# Patient Record
Sex: FEMALE | Race: BLACK OR AFRICAN AMERICAN | Employment: FULL TIME | URBAN - METROPOLITAN AREA
[De-identification: names, ages, dates, MRNs, and addresses within clinical notes are randomized per-mention and may not be internally consistent; named-entity substitution may affect disease eponyms.]

---

## 2017-05-23 ENCOUNTER — HOSPITAL ENCOUNTER (OUTPATIENT)
Dept: WOMENS IMAGING | Age: 62
Discharge: HOME | End: 2017-05-23
Attending: INTERNAL MEDICINE | Admitting: INTERNAL MEDICINE

## 2017-05-23 DIAGNOSIS — Z12.31 ENCOUNTER FOR SCREENING MAMMOGRAM FOR MALIGNANT NEOPLASM OF BREAST: ICD-10-CM

## 2020-08-26 ENCOUNTER — HOSPITAL ENCOUNTER (OUTPATIENT)
Age: 65
Setting detail: OBSERVATION
Discharge: HOME | End: 2020-08-27
Attending: EMERGENCY MEDICINE | Admitting: INTERNAL MEDICINE
Payer: COMMERCIAL

## 2020-08-26 ENCOUNTER — APPOINTMENT (OUTPATIENT)
Dept: GENERAL RADIOLOGY | Age: 65
End: 2020-08-26
Payer: COMMERCIAL

## 2020-08-26 LAB
ALBUMIN SERPL-MCNC: 4.5 GM/DL (ref 3.4–5)
ALP BLD-CCNC: 74 IU/L (ref 40–129)
ALT SERPL-CCNC: 22 U/L (ref 10–40)
ANION GAP SERPL CALCULATED.3IONS-SCNC: 13 MMOL/L (ref 4–16)
AST SERPL-CCNC: 29 IU/L (ref 15–37)
BASOPHILS ABSOLUTE: 0.1 K/CU MM
BASOPHILS RELATIVE PERCENT: 0.5 % (ref 0–1)
BILIRUB SERPL-MCNC: 0.5 MG/DL (ref 0–1)
BUN BLDV-MCNC: 21 MG/DL (ref 6–23)
CALCIUM SERPL-MCNC: 9.7 MG/DL (ref 8.3–10.6)
CHLORIDE BLD-SCNC: 99 MMOL/L (ref 99–110)
CO2: 25 MMOL/L (ref 21–32)
CREAT SERPL-MCNC: 1 MG/DL (ref 0.6–1.1)
D DIMER: <200 NG/ML(DDU)
DIFFERENTIAL TYPE: ABNORMAL
EOSINOPHILS ABSOLUTE: 0.3 K/CU MM
EOSINOPHILS RELATIVE PERCENT: 2.3 % (ref 0–3)
GFR AFRICAN AMERICAN: >60 ML/MIN/1.73M2
GFR NON-AFRICAN AMERICAN: 55 ML/MIN/1.73M2
GLUCOSE BLD-MCNC: 108 MG/DL (ref 70–99)
HCT VFR BLD CALC: 41.3 % (ref 37–47)
HEMOGLOBIN: 13.9 GM/DL (ref 12.5–16)
IMMATURE NEUTROPHIL %: 0.3 % (ref 0–0.43)
LIPASE: 30 IU/L (ref 13–60)
LYMPHOCYTES ABSOLUTE: 2.8 K/CU MM
LYMPHOCYTES RELATIVE PERCENT: 24.5 % (ref 24–44)
MCH RBC QN AUTO: 32.6 PG (ref 27–31)
MCHC RBC AUTO-ENTMCNC: 33.7 % (ref 32–36)
MCV RBC AUTO: 96.7 FL (ref 78–100)
MONOCYTES ABSOLUTE: 0.8 K/CU MM
MONOCYTES RELATIVE PERCENT: 7 % (ref 0–4)
NUCLEATED RBC %: 0 %
PDW BLD-RTO: 12.6 % (ref 11.7–14.9)
PLATELET # BLD: 392 K/CU MM (ref 140–440)
PMV BLD AUTO: 10.4 FL (ref 7.5–11.1)
POTASSIUM SERPL-SCNC: 3.3 MMOL/L (ref 3.5–5.1)
PRO-BNP: 34.93 PG/ML
RBC # BLD: 4.27 M/CU MM (ref 4.2–5.4)
SEGMENTED NEUTROPHILS ABSOLUTE COUNT: 7.6 K/CU MM
SEGMENTED NEUTROPHILS RELATIVE PERCENT: 65.4 % (ref 36–66)
SODIUM BLD-SCNC: 137 MMOL/L (ref 135–145)
TOTAL CK: 220 IU/L (ref 26–140)
TOTAL IMMATURE NEUTOROPHIL: 0.03 K/CU MM
TOTAL NUCLEATED RBC: 0 K/CU MM
TOTAL PROTEIN: 7.6 GM/DL (ref 6.4–8.2)
TROPONIN T: <0.01 NG/ML
WBC # BLD: 11.6 K/CU MM (ref 4–10.5)

## 2020-08-26 PROCEDURE — 36415 COLL VENOUS BLD VENIPUNCTURE: CPT

## 2020-08-26 PROCEDURE — 2580000003 HC RX 258: Performed by: EMERGENCY MEDICINE

## 2020-08-26 PROCEDURE — 2500000003 HC RX 250 WO HCPCS: Performed by: EMERGENCY MEDICINE

## 2020-08-26 PROCEDURE — 93005 ELECTROCARDIOGRAM TRACING: CPT | Performed by: EMERGENCY MEDICINE

## 2020-08-26 PROCEDURE — 71045 X-RAY EXAM CHEST 1 VIEW: CPT

## 2020-08-26 PROCEDURE — 83880 ASSAY OF NATRIURETIC PEPTIDE: CPT

## 2020-08-26 PROCEDURE — 84484 ASSAY OF TROPONIN QUANT: CPT

## 2020-08-26 PROCEDURE — 82550 ASSAY OF CK (CPK): CPT

## 2020-08-26 PROCEDURE — 85025 COMPLETE CBC W/AUTO DIFF WBC: CPT

## 2020-08-26 PROCEDURE — 96375 TX/PRO/DX INJ NEW DRUG ADDON: CPT

## 2020-08-26 PROCEDURE — 80053 COMPREHEN METABOLIC PANEL: CPT

## 2020-08-26 PROCEDURE — 99285 EMERGENCY DEPT VISIT HI MDM: CPT

## 2020-08-26 PROCEDURE — 6360000002 HC RX W HCPCS: Performed by: EMERGENCY MEDICINE

## 2020-08-26 PROCEDURE — G0378 HOSPITAL OBSERVATION PER HR: HCPCS

## 2020-08-26 PROCEDURE — 96374 THER/PROPH/DIAG INJ IV PUSH: CPT

## 2020-08-26 PROCEDURE — 83690 ASSAY OF LIPASE: CPT

## 2020-08-26 PROCEDURE — 6370000000 HC RX 637 (ALT 250 FOR IP): Performed by: EMERGENCY MEDICINE

## 2020-08-26 PROCEDURE — 85379 FIBRIN DEGRADATION QUANT: CPT

## 2020-08-26 RX ORDER — SODIUM CHLORIDE 9 MG/ML
INJECTION, SOLUTION INTRAVENOUS CONTINUOUS
Status: ACTIVE | OUTPATIENT
Start: 2020-08-27 | End: 2020-08-27

## 2020-08-26 RX ORDER — LISINOPRIL 20 MG/1
20 TABLET ORAL DAILY
Status: DISCONTINUED | OUTPATIENT
Start: 2020-08-27 | End: 2020-08-27 | Stop reason: HOSPADM

## 2020-08-26 RX ORDER — PANTOPRAZOLE SODIUM 40 MG/1
40 TABLET, DELAYED RELEASE ORAL
Status: DISCONTINUED | OUTPATIENT
Start: 2020-08-27 | End: 2020-08-27 | Stop reason: HOSPADM

## 2020-08-26 RX ORDER — POLYETHYLENE GLYCOL 3350 17 G/17G
17 POWDER, FOR SOLUTION ORAL DAILY PRN
Status: DISCONTINUED | OUTPATIENT
Start: 2020-08-26 | End: 2020-08-27 | Stop reason: HOSPADM

## 2020-08-26 RX ORDER — ONDANSETRON 2 MG/ML
4 INJECTION INTRAMUSCULAR; INTRAVENOUS EVERY 30 MIN PRN
Status: DISCONTINUED | OUTPATIENT
Start: 2020-08-26 | End: 2020-08-27 | Stop reason: SDUPTHER

## 2020-08-26 RX ORDER — ASPIRIN 81 MG/1
81 TABLET, CHEWABLE ORAL DAILY
Status: DISCONTINUED | OUTPATIENT
Start: 2020-08-27 | End: 2020-08-27 | Stop reason: HOSPADM

## 2020-08-26 RX ORDER — ONDANSETRON 2 MG/ML
4 INJECTION INTRAMUSCULAR; INTRAVENOUS EVERY 6 HOURS PRN
Status: DISCONTINUED | OUTPATIENT
Start: 2020-08-26 | End: 2020-08-27 | Stop reason: HOSPADM

## 2020-08-26 RX ORDER — 0.9 % SODIUM CHLORIDE 0.9 %
1000 INTRAVENOUS SOLUTION INTRAVENOUS ONCE
Status: COMPLETED | OUTPATIENT
Start: 2020-08-26 | End: 2020-08-27

## 2020-08-26 RX ORDER — SIMVASTATIN 40 MG
40 TABLET ORAL EVERY EVENING
Status: DISCONTINUED | OUTPATIENT
Start: 2020-08-27 | End: 2020-08-27 | Stop reason: SDUPTHER

## 2020-08-26 RX ORDER — ASPIRIN 81 MG/1
81 TABLET, CHEWABLE ORAL DAILY
Status: DISCONTINUED | OUTPATIENT
Start: 2020-08-27 | End: 2020-08-26 | Stop reason: SDUPTHER

## 2020-08-26 RX ORDER — SODIUM CHLORIDE 0.9 % (FLUSH) 0.9 %
10 SYRINGE (ML) INJECTION PRN
Status: DISCONTINUED | OUTPATIENT
Start: 2020-08-26 | End: 2020-08-27 | Stop reason: HOSPADM

## 2020-08-26 RX ORDER — CALCIUM CARBONATE 200(500)MG
500 TABLET,CHEWABLE ORAL 3 TIMES DAILY PRN
Status: DISCONTINUED | OUTPATIENT
Start: 2020-08-26 | End: 2020-08-27 | Stop reason: HOSPADM

## 2020-08-26 RX ORDER — PROMETHAZINE HYDROCHLORIDE 25 MG/1
12.5 TABLET ORAL EVERY 6 HOURS PRN
Status: DISCONTINUED | OUTPATIENT
Start: 2020-08-26 | End: 2020-08-27 | Stop reason: HOSPADM

## 2020-08-26 RX ORDER — CHLORTHALIDONE 25 MG/1
25 TABLET ORAL DAILY
COMMUNITY

## 2020-08-26 RX ORDER — ATORVASTATIN CALCIUM 40 MG/1
40 TABLET, FILM COATED ORAL NIGHTLY
Status: DISCONTINUED | OUTPATIENT
Start: 2020-08-27 | End: 2020-08-27 | Stop reason: HOSPADM

## 2020-08-26 RX ORDER — SODIUM CHLORIDE 0.9 % (FLUSH) 0.9 %
10 SYRINGE (ML) INJECTION EVERY 12 HOURS SCHEDULED
Status: DISCONTINUED | OUTPATIENT
Start: 2020-08-27 | End: 2020-08-27 | Stop reason: HOSPADM

## 2020-08-26 RX ORDER — ACETAMINOPHEN 325 MG/1
650 TABLET ORAL EVERY 6 HOURS PRN
Status: DISCONTINUED | OUTPATIENT
Start: 2020-08-26 | End: 2020-08-27 | Stop reason: HOSPADM

## 2020-08-26 RX ORDER — ASPIRIN 81 MG/1
324 TABLET, CHEWABLE ORAL ONCE
Status: COMPLETED | OUTPATIENT
Start: 2020-08-26 | End: 2020-08-26

## 2020-08-26 RX ORDER — NITROGLYCERIN 0.4 MG/1
0.4 TABLET SUBLINGUAL EVERY 5 MIN PRN
Status: DISCONTINUED | OUTPATIENT
Start: 2020-08-26 | End: 2020-08-27 | Stop reason: HOSPADM

## 2020-08-26 RX ORDER — POTASSIUM CHLORIDE 7.45 MG/ML
10 INJECTION INTRAVENOUS PRN
Status: DISCONTINUED | OUTPATIENT
Start: 2020-08-26 | End: 2020-08-27 | Stop reason: HOSPADM

## 2020-08-26 RX ORDER — ACETAMINOPHEN 650 MG/1
650 SUPPOSITORY RECTAL EVERY 6 HOURS PRN
Status: DISCONTINUED | OUTPATIENT
Start: 2020-08-26 | End: 2020-08-27 | Stop reason: HOSPADM

## 2020-08-26 RX ORDER — MORPHINE SULFATE 4 MG/ML
4 INJECTION, SOLUTION INTRAMUSCULAR; INTRAVENOUS EVERY 30 MIN PRN
Status: DISCONTINUED | OUTPATIENT
Start: 2020-08-26 | End: 2020-08-27 | Stop reason: ALTCHOICE

## 2020-08-26 RX ADMIN — ONDANSETRON 4 MG: 2 INJECTION INTRAMUSCULAR; INTRAVENOUS at 23:10

## 2020-08-26 RX ADMIN — ASPIRIN 81 MG CHEWABLE TABLET 324 MG: 81 TABLET CHEWABLE at 22:12

## 2020-08-26 RX ADMIN — MORPHINE SULFATE 4 MG: 4 INJECTION, SOLUTION INTRAMUSCULAR; INTRAVENOUS at 23:10

## 2020-08-26 RX ADMIN — FAMOTIDINE 20 MG: 10 INJECTION INTRAVENOUS at 23:10

## 2020-08-26 RX ADMIN — SODIUM CHLORIDE 1000 ML: 9 INJECTION, SOLUTION INTRAVENOUS at 23:26

## 2020-08-26 ASSESSMENT — ENCOUNTER SYMPTOMS
SHORTNESS OF BREATH: 1
EYES NEGATIVE: 1
CHEST TIGHTNESS: 1
ALLERGIC/IMMUNOLOGIC NEGATIVE: 1
NAUSEA: 1

## 2020-08-26 ASSESSMENT — PAIN DESCRIPTION - PAIN TYPE: TYPE: ACUTE PAIN

## 2020-08-26 ASSESSMENT — PAIN SCALES - GENERAL
PAINLEVEL_OUTOF10: 7
PAINLEVEL_OUTOF10: 7

## 2020-08-26 ASSESSMENT — PAIN DESCRIPTION - ORIENTATION: ORIENTATION: MID

## 2020-08-26 ASSESSMENT — PAIN DESCRIPTION - LOCATION: LOCATION: CHEST

## 2020-08-26 ASSESSMENT — PAIN DESCRIPTION - FREQUENCY: FREQUENCY: CONTINUOUS

## 2020-08-26 ASSESSMENT — PAIN DESCRIPTION - DESCRIPTORS: DESCRIPTORS: PRESSURE

## 2020-08-26 NOTE — ED PROVIDER NOTES
[facility]      TRIAGE CHIEF COMPLAINT:   Chest Pain      [place]:  Kike Schmidt is a 77 y.o. female that presents complaint of chest pain shortness of breath diaphoresis. Symptoms started around 330 today after getting off work. 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 Denies any fevers nausea vomiting cough coronavirus exposure abdominal pain she had some heartburn. History of hypertension, high cholesterol denies any cardiac problems or other lung problems. Denies any smoking drinking drugs. No swelling or rash no history of DVT PE or AAA. Tommas Baptise REVIEW OF SYSTEMS:  At least 10 systems reviewed and otherwise acutely negative except as in the [address] Review of Systems   Constitutional: Positive for diaphoresis. HENT: Negative. Eyes: Negative. Respiratory: Positive for chest tightness and shortness of breath. Cardiovascular: Positive for chest pain. Gastrointestinal: Positive for nausea. Endocrine: Negative. Genitourinary: Negative. Musculoskeletal: Negative. Skin: Negative. Allergic/Immunologic: Negative. Neurological: Negative. Hematological: Negative. Psychiatric/Behavioral: Negative. All other systems reviewed and are negative.       Past Medical History:   Diagnosis Date    Chronic pain     sciatica    GERD (gastroesophageal reflux disease)     Hyperlipidemia 7/2010    Hypertension 2007     Past Surgical History:   Procedure Laterality Date    CARDIAC CATHETERIZATION      CARDIOVASCULAR STRESS TEST  2/2013    normal    TUBAL LIGATION       Family History   Problem Relation Age of Onset    Hypertension Mother         aneurism    Diabetes Father         type 2    Stroke Father     Hypertension Sister     High Cholesterol Sister      Social History     Socioeconomic History    Marital status:      Spouse name: Not on file    Number of children: Not on file    Years of education: Not on file    Highest education level: lisinopril (PRINIVIL;ZESTRIL) 20 MG tablet Take 1 tablet by mouth daily. 30 tablet 4    famotidine (PEPCID) 40 MG tablet Take 1 tablet by mouth 2 times daily. 60 tablet 2    ibuprofen (ADVIL;MOTRIN) 200 MG tablet Take 200 mg by mouth every 6 hours as needed.  aspirin 81 MG tablet Take 81 mg by mouth daily. No Known Allergies  Current Facility-Administered Medications   Medication Dose Route Frequency Provider Last Rate Last Dose    morphine sulfate (PF) injection 4 mg  4 mg Intravenous Q30 Min PRN Ruffus Bolk, DO        ondansetron (ZOFRAN) injection 4 mg  4 mg Intravenous Q30 Min PRN Ruffus Bolk, DO        famotidine (PEPCID) injection 20 mg  20 mg Intravenous Once Ruffus Bolk, DO        0.9 % sodium chloride bolus  1,000 mL Intravenous Once Ruffus Bolk, DO         Current Outpatient Medications   Medication Sig Dispense Refill    simvastatin (ZOCOR) 40 MG tablet Take 1 tablet by mouth every evening. 30 tablet 3    atenolol-chlorthalidone (TENORETIC 100) 100-25 MG per tablet Take 1 tablet by mouth daily. 30 tablet 4    lisinopril (PRINIVIL;ZESTRIL) 20 MG tablet Take 1 tablet by mouth daily. 30 tablet 4    famotidine (PEPCID) 40 MG tablet Take 1 tablet by mouth 2 times daily. 60 tablet 2    ibuprofen (ADVIL;MOTRIN) 200 MG tablet Take 200 mg by mouth every 6 hours as needed.  aspirin 81 MG tablet Take 81 mg by mouth daily. Nursing Notes Reviewed    VITAL SIGNS:  ED Triage Vitals [08/26/20 1916]   Enc Vitals Group      /86      Pulse 99      Resp 20      Temp 98.6 °F (37 °C)      Temp Source Oral      SpO2 97 %      Weight 140 lb (63.5 kg)      Height 5' 3\" (1.6 m)      Head Circumference       Peak Flow       Pain Score       Pain Loc       Pain Edu? Excl. in [address]? PHYSICAL EXAM:  Physical Exam  Vitals signs and nursing note reviewed. Constitutional:       General: She is not in acute distress. Appearance: Normal appearance.  She is well-developed and well-groomed. She is not ill-appearing, toxic-appearing or diaphoretic. HENT:      Head: Normocephalic and atraumatic. Right Ear: External ear normal.      Left Ear: External ear normal.      Nose: No congestion or rhinorrhea. Eyes:      General: No scleral icterus. Right eye: No discharge. Left eye: No discharge. Extraocular Movements: Extraocular movements intact. Conjunctiva/sclera: Conjunctivae normal.   Neck:      Musculoskeletal: Full passive range of motion without pain and normal range of motion. Normal range of motion. No edema, erythema, neck rigidity, crepitus, injury or pain with movement. Vascular: No JVD. Trachea: Phonation normal.   Cardiovascular:      Rate and Rhythm: Normal rate and regular rhythm. Pulses: Normal pulses. Heart sounds: Normal heart sounds. No murmur. No friction rub. No gallop. Pulmonary:      Effort: Pulmonary effort is normal. No respiratory distress. Breath sounds: Normal breath sounds. No stridor. No wheezing, rhonchi or rales. Chest:      Chest wall: Tenderness present. Abdominal:      General: Bowel sounds are normal. There is no distension. Palpations: Abdomen is soft. There is no mass or pulsatile mass. Tenderness: There is abdominal tenderness in the epigastric area. There is no guarding or rebound. Negative signs include Estrada's sign, Rovsing's sign and McBurney's sign. Hernia: No hernia is present. Musculoskeletal: Normal range of motion. General: Tenderness present. No swelling, deformity or signs of injury. Right lower leg: No edema. Left lower leg: No edema. Skin:     General: Skin is warm. Coloration: Skin is not jaundiced or pale. Findings: No bruising, erythema, lesion or rash. Neurological:      General: No focal deficit present. Mental Status: She is alert and oriented to person, place, and time.       GCS: GCS eye subscore is 4. GCS verbal subscore is 5. GCS motor subscore is 6. Cranial Nerves: Cranial nerves are intact. No cranial nerve deficit, dysarthria or facial asymmetry. Sensory: Sensation is intact. No sensory deficit. Motor: Motor function is intact. No weakness, tremor, atrophy, abnormal muscle tone or seizure activity. Coordination: Coordination is intact. Coordination normal.   Psychiatric:         Mood and Affect: Mood normal.         Behavior: Behavior normal. Behavior is cooperative. Thought Content:  Thought content normal.         Judgment: Judgment normal.           I have reviewed andinterpreted all of the currently available lab results from this visit (if applicable):    Results for orders placed or performed during the hospital encounter of 08/26/20   CBC with Auto Diff   Result Value Ref Range    WBC 11.6 (H) 4.0 - 10.5 K/CU MM    RBC 4.27 4.2 - 5.4 M/CU MM    Hemoglobin 13.9 12.5 - 16.0 GM/DL    Hematocrit 41.3 37 - 47 %    MCV 96.7 78 - 100 FL    MCH 32.6 (H) 27 - 31 PG    MCHC 33.7 32.0 - 36.0 %    RDW 12.6 11.7 - 14.9 %    Platelets 715 695 - 916 K/CU MM    MPV 10.4 7.5 - 11.1 FL    Differential Type AUTOMATED DIFFERENTIAL     Segs Relative 65.4 36 - 66 %    Lymphocytes % 24.5 24 - 44 %    Monocytes % 7.0 (H) 0 - 4 %    Eosinophils % 2.3 0 - 3 %    Basophils % 0.5 0 - 1 %    Segs Absolute 7.6 K/CU MM    Lymphocytes Absolute 2.8 K/CU MM    Monocytes Absolute 0.8 K/CU MM    Eosinophils Absolute 0.3 K/CU MM    Basophils Absolute 0.1 K/CU MM    Nucleated RBC % 0.0 %    Total Nucleated RBC 0.0 K/CU MM    Total Immature Neutrophil 0.03 K/CU MM    Immature Neutrophil % 0.3 0 - 0.43 %   CMP   Result Value Ref Range    Sodium 137 135 - 145 MMOL/L    Potassium 3.3 (L) 3.5 - 5.1 MMOL/L    Chloride 99 99 - 110 mMol/L    CO2 25 21 - 32 MMOL/L    BUN 21 6 - 23 MG/DL    CREATININE 1.0 0.6 - 1.1 MG/DL    Glucose 108 (H) 70 - 99 MG/DL    Calcium 9.7 8.3 - 10.6 MG/DL    Alb 4.5 3.4 - 5.0 GM/DL    Total this afternoon after she was off work. 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 Denies any fevers nausea vomiting cough coronavirus exposure trauma weakness numbness tingling she says she got sweaty had some heartburn she is a history of hypertension, high cholesterol denies any cardiac or lung problems or smoking drinking drugs. No paresthesias no swelling or rash. Patient appears otherwise well no distress vital signs are stable patient given aspirin pain nausea medicine so far work-up is negative occluding d-dimer chest x-ray labs troponin EKG. Patient does have chest wall pain that does reproduce her symptoms she could have musculoskeletal pain but given her age and risk factors and symptoms will admit for ACS rule out otherwise patient stable. Patient okay with plan. Consulted hospital medicine. CLINICAL IMPRESSION:  Final diagnoses:   Chest pain, unspecified type   Chest wall pain   Dyspnea, unspecified type       (Please note that portions of this note may have been completed with a voice recognition program. Efforts were made to edit the dictations but occasionally words aremis-transcribed.)    DISPOSITION REFERRAL (if applicable):  No follow-up provider specified.     DISPOSITION MEDICATIONS (if applicable):  New Prescriptions    No medications on file          Crittenden Roel, [address], DO  08/26/20 7739

## 2020-08-26 NOTE — ED PROVIDER NOTES
As physician-in-triage, I performed a medical screening history and physical exam on this patient. HISTORY OF PRESENT ILLNESS  Ari Ahumada is a 77 y.o. female presents to the emergency department with central chest pressure that started at rest prior to arrival.  Describes it as constant, aching, 2 out of 10. Does not radiate up to her neck, through to her back, down her arms. Denies any diaphoresis, cough, fevers, chills, shortness of breath, dizziness, near syncope. No sick contacts at home. Denies any cough, fevers or chills. Denies any known CAD. Denies any abdominal pain. Faina Dakota PHYSICAL EXAM  /86   Pulse 99   Temp 98.6 °F (37 °C) (Oral)   Resp 20   Ht 5' 3\" (1.6 m)   Wt 140 lb (63.5 kg)   SpO2 97%   BMI 24.80 kg/m²     On exam, the patient appears in no acute distress. Speech is clear. Breathing is unlabored. Moves all extremities    Comment: Please note this report has been produced using speech recognition software and may contain errors related to that system including errors in grammar, punctuation, and spelling, as well as words and phrases that may be inappropriate. If there are any questions or concerns please feel free to contact the dictating provider for clarification.        Alma Espinal MD  08/26/20 0690

## 2020-08-26 NOTE — LETTER
[address] [address] [place] [address]  Phone: [phone]             August 27, 2020    Patient: Quynh Munoz   YOB: 1955   Date of Visit: 8/26/2020       To Whom It May Concern:    Quynh Munoz was seen and treated in our facility  beginning 8/26/2020 until 08/27/220. She may return to work on 08/29/2020.       Sincerely,       Dr. Rao [facility], [phone]        Signature:__________________________________

## 2020-08-27 ENCOUNTER — APPOINTMENT (OUTPATIENT)
Dept: NUCLEAR MEDICINE | Age: 65
End: 2020-08-27
Payer: COMMERCIAL

## 2020-08-27 VITALS
HEART RATE: 75 BPM | HEIGHT: 63 IN | SYSTOLIC BLOOD PRESSURE: 128 MMHG | OXYGEN SATURATION: 96 % | TEMPERATURE: 98.3 F | WEIGHT: 141.98 LBS | DIASTOLIC BLOOD PRESSURE: 79 MMHG | BODY MASS INDEX: 25.16 KG/M2 | RESPIRATION RATE: 19 BRPM

## 2020-08-27 LAB
ANION GAP SERPL CALCULATED.3IONS-SCNC: 8 MMOL/L (ref 4–16)
BACTERIA: NEGATIVE /HPF
BILIRUBIN URINE: NEGATIVE MG/DL
BLOOD, URINE: NEGATIVE
BUN BLDV-MCNC: 15 MG/DL (ref 6–23)
CALCIUM SERPL-MCNC: 8.7 MG/DL (ref 8.3–10.6)
CHLORIDE BLD-SCNC: 106 MMOL/L (ref 99–110)
CLARITY: CLEAR
CO2: 27 MMOL/L (ref 21–32)
COLOR: YELLOW
CREAT SERPL-MCNC: 0.9 MG/DL (ref 0.6–1.1)
EKG ATRIAL RATE: 67 BPM
EKG ATRIAL RATE: 93 BPM
EKG DIAGNOSIS: NORMAL
EKG DIAGNOSIS: NORMAL
EKG P AXIS: 49 DEGREES
EKG P AXIS: 53 DEGREES
EKG P-R INTERVAL: 190 MS
EKG P-R INTERVAL: 204 MS
EKG Q-T INTERVAL: 360 MS
EKG Q-T INTERVAL: 442 MS
EKG QRS DURATION: 84 MS
EKG QRS DURATION: 86 MS
EKG QTC CALCULATION (BAZETT): 447 MS
EKG QTC CALCULATION (BAZETT): 467 MS
EKG R AXIS: -49 DEGREES
EKG R AXIS: -60 DEGREES
EKG T AXIS: 30 DEGREES
EKG T AXIS: 43 DEGREES
EKG VENTRICULAR RATE: 67 BPM
EKG VENTRICULAR RATE: 93 BPM
GFR AFRICAN AMERICAN: >60 ML/MIN/1.73M2
GFR NON-AFRICAN AMERICAN: >60 ML/MIN/1.73M2
GLUCOSE BLD-MCNC: 83 MG/DL (ref 70–99)
GLUCOSE, URINE: NEGATIVE MG/DL
HCT VFR BLD CALC: 36.5 % (ref 37–47)
HEMOGLOBIN: 12.1 GM/DL (ref 12.5–16)
KETONES, URINE: NEGATIVE MG/DL
LEUKOCYTE ESTERASE, URINE: ABNORMAL
LV EF: 55 %
LV EF: 76 %
LVEF MODALITY: NORMAL
LVEF MODALITY: NORMAL
MCH RBC QN AUTO: 32.7 PG (ref 27–31)
MCHC RBC AUTO-ENTMCNC: 33.2 % (ref 32–36)
MCV RBC AUTO: 98.6 FL (ref 78–100)
NITRITE URINE, QUANTITATIVE: NEGATIVE
PDW BLD-RTO: 12.7 % (ref 11.7–14.9)
PH, URINE: 7 (ref 5–8)
PLATELET # BLD: 337 K/CU MM (ref 140–440)
PMV BLD AUTO: 10.4 FL (ref 7.5–11.1)
POTASSIUM SERPL-SCNC: 3.8 MMOL/L (ref 3.5–5.1)
PROTEIN UA: NEGATIVE MG/DL
RBC # BLD: 3.7 M/CU MM (ref 4.2–5.4)
RBC URINE: 1 /HPF (ref 0–6)
RENAL EPITHELIAL, UA: 1 /HPF
SODIUM BLD-SCNC: 141 MMOL/L (ref 135–145)
SPECIFIC GRAVITY UA: 1 (ref 1–1.03)
SQUAMOUS EPITHELIAL: 1 /HPF
TOTAL CK: 144 IU/L (ref 26–140)
TROPONIN T: <0.01 NG/ML
TROPONIN T: <0.01 NG/ML
UROBILINOGEN, URINE: NORMAL MG/DL (ref 0.2–1)
WBC # BLD: 8.1 K/CU MM (ref 4–10.5)
WBC UA: 11 /HPF (ref 0–5)

## 2020-08-27 PROCEDURE — G0378 HOSPITAL OBSERVATION PER HR: HCPCS

## 2020-08-27 PROCEDURE — 6360000002 HC RX W HCPCS: Performed by: INTERNAL MEDICINE

## 2020-08-27 PROCEDURE — 93010 ELECTROCARDIOGRAM REPORT: CPT | Performed by: INTERNAL MEDICINE

## 2020-08-27 PROCEDURE — 78452 HT MUSCLE IMAGE SPECT MULT: CPT

## 2020-08-27 PROCEDURE — 93005 ELECTROCARDIOGRAM TRACING: CPT | Performed by: NURSE PRACTITIONER

## 2020-08-27 PROCEDURE — 94761 N-INVAS EAR/PLS OXIMETRY MLT: CPT

## 2020-08-27 PROCEDURE — 84484 ASSAY OF TROPONIN QUANT: CPT

## 2020-08-27 PROCEDURE — 2580000003 HC RX 258: Performed by: NURSE PRACTITIONER

## 2020-08-27 PROCEDURE — 81001 URINALYSIS AUTO W/SCOPE: CPT

## 2020-08-27 PROCEDURE — 85027 COMPLETE CBC AUTOMATED: CPT

## 2020-08-27 PROCEDURE — 93017 CV STRESS TEST TRACING ONLY: CPT

## 2020-08-27 PROCEDURE — A9500 TC99M SESTAMIBI: HCPCS | Performed by: INTERNAL MEDICINE

## 2020-08-27 PROCEDURE — 87086 URINE CULTURE/COLONY COUNT: CPT

## 2020-08-27 PROCEDURE — 3430000000 HC RX DIAGNOSTIC RADIOPHARMACEUTICAL: Performed by: INTERNAL MEDICINE

## 2020-08-27 PROCEDURE — 93306 TTE W/DOPPLER COMPLETE: CPT

## 2020-08-27 PROCEDURE — 80048 BASIC METABOLIC PNL TOTAL CA: CPT

## 2020-08-27 PROCEDURE — 36415 COLL VENOUS BLD VENIPUNCTURE: CPT

## 2020-08-27 PROCEDURE — 82550 ASSAY OF CK (CPK): CPT

## 2020-08-27 PROCEDURE — 99203 OFFICE O/P NEW LOW 30 MIN: CPT | Performed by: INTERNAL MEDICINE

## 2020-08-27 PROCEDURE — 6370000000 HC RX 637 (ALT 250 FOR IP): Performed by: NURSE PRACTITIONER

## 2020-08-27 RX ORDER — ATENOLOL AND CHLORTHALIDONE TABLET 100; 25 MG/1; MG/1
1 TABLET ORAL DAILY
Qty: 30 TABLET | Refills: 4 | Status: SHIPPED | OUTPATIENT
Start: 2020-08-27

## 2020-08-27 RX ORDER — SIMVASTATIN 40 MG
40 TABLET ORAL EVERY EVENING
Qty: 30 TABLET | Refills: 3 | Status: SHIPPED | OUTPATIENT
Start: 2020-08-27

## 2020-08-27 RX ADMIN — REGADENOSON 0.4 MG: 0.08 INJECTION, SOLUTION INTRAVENOUS at 12:39

## 2020-08-27 RX ADMIN — Medication 30 MILLICURIE: at 12:45

## 2020-08-27 RX ADMIN — SODIUM CHLORIDE: 9 INJECTION, SOLUTION INTRAVENOUS at 01:00

## 2020-08-27 RX ADMIN — LISINOPRIL 20 MG: 20 TABLET ORAL at 15:30

## 2020-08-27 RX ADMIN — Medication 10 MILLICURIE: at 11:10

## 2020-08-27 RX ADMIN — METOPROLOL TARTRATE 25 MG: 25 TABLET, FILM COATED ORAL at 15:30

## 2020-08-27 RX ADMIN — SODIUM CHLORIDE, PRESERVATIVE FREE 10 ML: 5 INJECTION INTRAVENOUS at 15:30

## 2020-08-27 ASSESSMENT — PAIN SCALES - GENERAL
PAINLEVEL_OUTOF10: 0
PAINLEVEL_OUTOF10: 0

## 2020-08-27 NOTE — DISCHARGE SUMMARY
Discharge Summary    Name:  Ari Ahumada /Age/Sex: 1955  (77 y.o. female)   MRN & CSN:  [id number] & [id number] Admission Date/Time: 2020  8:54 PM   Attending:  Josse Lynn MD Discharging Physician: Hero Grover MD     Hospital Course:   Ari Ahumada is a 77 y.o.  female  who presents with chest pain.     #Acute Chest pain. R/O ACS. -Serial troponin have been negative  -EKG no acute ST-T changes  -Echo: EF 55%, no mention of diastolic dysfunction. -ASA, BB. Home lisinopril resumed.  -Stress test negative  -Plan is for DC on on her current medications and follow-up with cardiology as outpatient    Other medical conditions  Hypertension-lisinopril  Hyperlipidemia-Zocor  GERD-Protonix            The patient expressed appropriate understanding of and agreement with the discharge recommendations, medications, and plan. Consults this admission:  IP CONSULT TO HOSPITALIST  IP CONSULT TO CARDIOLOGY    Discharge Instruction:   Follow up appointments: Cardiology  Primary care physician:  within 2 weeks    Diet:  cardiac diet   Activity: activity as tolerated  Disposition: Discharged to:   [x]Home, [][facility], []SNF, []Acute Rehab, []Hospice   Condition on discharge: Stable    Discharge Medications:      Yovannyalexa Chao   Home Medication Instructions HEP:[id number]    Printed on:20 1014   Medication Information                      aspirin 81 MG tablet  Take 81 mg by mouth daily. chlorthalidone (HYGROTON) 25 MG tablet  Take 25 mg by mouth daily             famotidine (PEPCID) 40 MG tablet  Take 1 tablet by mouth 2 times daily. ibuprofen (ADVIL;MOTRIN) 200 MG tablet  Take 200 mg by mouth every 6 hours as needed. lisinopril (PRINIVIL;ZESTRIL) 20 MG tablet  Take 1 tablet by mouth daily.                  Objective Findings at Discharge:   /85   Pulse 75   Temp 97.9 °F (36.6 °C) (Oral)   Resp 19   Ht 5' 3\" (1.6 m)   Wt 141 lb 15.6 oz (64.4 kg)   SpO2 96%   BMI 25.15 kg/m²            PHYSICAL EXAM   GEN Awake female, sitting upright in bed in no apparent distress. Appears given age. EYES Pupils are equally round. No scleral erythema, discharge, or conjunctivitis. HENT Mucous membranes are moist. Oral pharynx without exudates, no evidence of thrush. NECK Supple, no apparent thyromegaly or masses. RESP Clear to auscultation, no wheezes, rales or rhonchi. Symmetric chest movement while on room air. CARDIO/VASC S1/S2 auscultated. Regular rate without appreciable murmurs, rubs, or gallops. No JVD or carotid bruits. Peripheral pulses equal bilaterally and palpable. No peripheral edema. GI Abdomen is soft without significant tenderness, masses, or guarding. Bowel sounds are normoactive. Rectal exam deferred.  No costovertebral angle tenderness. Normal appearing external genitalia. Willett catheter is not present. HEME/LYMPH No palpable cervical lymphadenopathy and no hepatosplenomegaly. No petechiae or ecchymoses. MSK No gross joint deformities. SKIN Normal coloration, warm, dry. NEURO Cranial nerves appear grossly intact, normal speech, no lateralizing weakness. PSYCH Awake, alert, oriented x 4. Affect appropriate.     BMP/CBC  Recent Labs     08/26/20  1914 08/27/20  0759     --    K 3.3*  --    CL 99  --    CO2 25  --    BUN 21  --    CREATININE 1.0  --    WBC 11.6* 8.1   HCT 41.3 36.5*    337       IMAGING:      Discharge Time of 35 minutes    Electronically signed by Johanna Nick MD on 8/27/2020 at 10:14 AM

## 2020-08-27 NOTE — PLAN OF CARE
RN  Outcome: Ongoing  8/27/2020 0025 by Elnea Duenas RN  Outcome: Ongoing     Problem: Serum Glucose Level - Abnormal:  Goal: Ability to maintain appropriate glucose levels will improve  Description: Ability to maintain appropriate glucose levels will improve  8/27/2020 0938 by Kin Menchaca RN  Outcome: Ongoing  8/27/2020 0025 by Elena Duenas RN  Outcome: Ongoing     Problem: Pain:  Goal: Pain level will decrease  Description: Pain level will decrease  8/27/2020 0938 by Kin Menchaca RN  Outcome: Ongoing  8/27/2020 0025 by Elena Duenas RN  Outcome: Ongoing  Goal: Control of acute pain  Description: Control of acute pain  8/27/2020 0938 by Kin Menchaca RN  Outcome: Ongoing  8/27/2020 0025 by Elena Duenas RN  Outcome: Ongoing  Goal: Control of chronic pain  Description: Control of chronic pain  8/27/2020 0938 by Kin Menchaca RN  Outcome: Ongoing  8/27/2020 0025 by Elena Duenas RN  Outcome: Ongoing     Problem: Tissue Perfusion - Cardiopulmonary, Altered:  Goal: Absence of angina  Description: Absence of angina  8/27/2020 0938 by Kin Menchaca RN  Outcome: Ongoing  8/27/2020 0025 by Elena Duenas RN  Outcome: Ongoing  Goal: Circulatory function within specified parameters  Description: Circulatory function within specified parameters  8/27/2020 0938 by Kin Menchaca RN  Outcome: Ongoing  8/27/2020 0025 by Elena Duenas RN  Outcome: Ongoing  Goal: Hemodynamic stability will improve  Description: Hemodynamic stability will improve  8/27/2020 0938 by Kin Menchaca RN  Outcome: Ongoing  8/27/2020 0025 by Elena Duenas RN  Outcome: Ongoing     Problem: Tobacco Use:  Goal: Will participate in inpatient tobacco-use cessation counseling  Description: Will participate in inpatient tobacco-use cessation counseling  8/27/2020 0938 by Kin Menchaca RN  Outcome: Ongoing  8/27/2020 0025 by Elena Duenas RN  Outcome: Ongoing

## 2020-08-27 NOTE — ED NOTES
Report given to [facility] on [address].       Lucy Sheehan RN  08/26/20 1580

## 2020-08-27 NOTE — CARE COORDINATION
Attempted to call patient on both her hospital phone and her cell phone with no answer. Patient from home with her  and is independent with all needs, has a PCP though does not have insurance. Med Assist information placed on her AVS at this time. CM available if needs arise.

## 2020-08-27 NOTE — CONSULTS
CARDIOLOGY CONSULT NOTE    Reason for consultation: Chest pain     Referring physician: Jose Bowden MD      Primary care physician: Alecia Lu        Dear Jose Bowden MD   Thanks for the consult.     History of present illness:Ubaldo is a 77 y. o.year old who  presents with  chest pain for last few days, happening daily, intermittent for 15 to 20 mins and aggravated with activity substernal also,reproducible with palpation, radiated to shoulder, 6/10, tender to touch,associated with shortness of breath, + sweating, nausea, did not get NTG in ED. No fever, no chills, no nausea no vomiting. Blood pressure, cholesterol, blood glucose and weight are well controlled.     Chief Complaint   Patient presents with    Chest Pain       Past medical history:    has a past medical history of Chronic pain, GERD (gastroesophageal reflux disease), Hyperlipidemia, and Hypertension. Past surgical history:   has a past surgical history that includes cardiovascular stress test (2/2013); Tubal ligation; and Cardiac catheterization. Social History:   reports that she has never smoked. She has never used smokeless tobacco. She reports current alcohol use. She reports previous drug use. Drug: Cocaine.   Family history:   no family history of CAD, STROKE of DM    aspirin chewable tablet 81 mg, Daily  lisinopril (PRINIVIL;ZESTRIL) tablet 20 mg, Daily  pantoprazole (PROTONIX) tablet 40 mg, QAM AC  sodium chloride flush 0.9 % injection 10 mL, 2 times per day  sodium chloride flush 0.9 % injection 10 mL, PRN  acetaminophen (TYLENOL) tablet 650 mg, Q6H PRN    Or  acetaminophen (TYLENOL) suppository 650 mg, Q6H PRN  polyethylene glycol (GLYCOLAX) packet 17 g, Daily PRN  promethazine (PHENERGAN) tablet 12.5 mg, Q6H PRN    Or  ondansetron (ZOFRAN) injection 4 mg, Q6H PRN  atorvastatin (LIPITOR) tablet 40 mg, Nightly  enoxaparin (LOVENOX) injection 40 mg, Daily  nitroGLYCERIN (NITROSTAT) SL tablet 0.4 mg, Q5 Min ALEXSANDRA Wyatt - CNP        0.9 % sodium chloride infusion   Intravenous Continuous Oriana SUZIE WyattAYDEE -  mL/hr at 08/27/20 0100            Review of Systems:    · Constitutional: No Fever or Weight Loss    · Eyes: No Decreased Vision  · ENT: No Headaches, Hearing Loss or Vertigo  · Cardiovascular: + chest pain, dyspnea on exertion, palpitations or loss of consciousness  · Respiratory: No cough or wheezing    · Gastrointestinal: No abdominal pain, appetite loss, blood in stools, constipation, diarrhea or heartburn  · Genitourinary: No dysuria, trouble voiding, or hematuria  · Musculoskeletal: No gait disturbance, weakness or joint complaints  · Integumentary: No rash or pruritis  · Neurological: No TIA or stroke symptoms  · Psychiatric: No anxiety or depression  · Endocrine: No malaise, fatigue or temperature intolerance  · Hematologic/Lymphatic: No bleeding problems, blood clots or swollen lymph nodes  · Allergic/Immunologic: No nasal congestion or hives  All systems negative except as marked.      ·    ·    ·      Physical Examination:    Vitals:    08/27/20 0222   BP: 85/64, 102/75   Pulse: 71   Temp: 97.8 °F (36.6 °C)   SpO2:       Wt Readings from Last 3 Encounters:   08/27/20 141 lb 15.6 oz (64.4 kg)   05/13/14 150 lb 12.8 oz (68.4 kg)   04/18/14 145 lb (65.8 kg)     Body mass index is 25.15 kg/m².        General Appearance: No distress, conversant     Constitutional: Well developed, Well nourished, No acute distress, Non-toxic appearance.    HENT:  Normocephalic, Atraumatic, Bilateral external ears normal, Oropharynx moist, No oral exudates, Nose normal. Neck- Normal range of motion, No tenderness, Supple, No stridor,no apical-carotid delay, no carotid bruit  Eyes: PERRL, EOMI, Conjunctiva normal, No discharge.    Respiratory:  Normal breath sounds, No respiratory distress, No wheezing, No chest tenderness. ,no use of accessory muscles, diaphragm movement is normal  Cardiovascular: (PMI) apex non displaced,no lifts no thrills, no s3,no s4, Normal heart rate, Normal rhythm, No murmurs, No rubs, No gallops. Carotid arteries pulse and amplitude are normal no bruit, no abdominal bruit noted ( normal abdominal aorta ausculation), femoral arteries pulse and amplitude are normal no bruit, pedal pulses are normal.  GI: Bowel sounds normal, Soft, No tenderness, No masses, No pulsatile masses, no hepatosplenomegally, no bruits  : External genitalia appear normal, No masses or lesions. No discharge. No CVA tenderness.    Musculoskeletal: Intact distal pulses, No edema, No tenderness, No cyanosis, No clubbing. Good range of motion in all major joints. No tenderness to palpation or major deformities noted. Back- No tenderness. Integument: Warm, Dry, No erythema, No rash. Skin: no rash, no ulcers  Lymphatic: No lymphadenopathy noted.    Neurologic: Alert & oriented x 3, Normal motor function, Normal sensory function, No focal deficits noted.    Psychiatric: Affect normal, Judgment normal, Mood normal.   Lab Review        Recent Labs       09/28/16  0010    WBC  12.3*    HGB  14.4    HCT  43.8    PLT  316            Recent Labs       09/28/16  0010    NA  136    K  3.9    CL  95*    CO2  23    BUN  10    CREATININE  0.8           Recent Labs       09/28/16  0010    AST  12*    ALT  10    BILITOT  0.4    ALKPHOS  124       No results for input(s): TROPONINI in the last 72 hours. No results found for: BNP  No results found for: INR, PROTIME        EKG:nsr     Chest Xray:nad     ECHO:pending  Labs, echo, meds reviewed  Assessment:      Recommendations:     1. Chest pain: out patient stress test and echo  2. HTN\" STABLE ON LOPRESSOR AND LISINOPRIL  3. DYSLIPIDEMIA: CONTINUE STATINS  4.  Health maintenance: exerise and diet  All labs, medications and tests reviewed, continue all other medications of all above medical condition listed as is.          Tu Page MD,   Tu Page MD, 8/27/2020 7:56 AM

## 2020-08-27 NOTE — H&P
HISTORY AND PHYSICAL  (Hospitalist, Internal Medicine)  IDENTIFYING INFORMATION   PATIENT:  Kike Schmidt  MRN:  [id number]  ADMIT DATE: 8/26/2020  TIME OF EVALUATION: 8/26/2020 10:53 PM    CHIEF COMPLAINT   Chest pain  Shortness of breath  Diaphoresis    HISTORY OF PRESENT ILLNESS   Kike Schmidt is a 77 y.o. female with a past medical history of hypertension, hyperlipidemia and acid reflux. She presented to the ED with complaints of <24hrs mid epigastric chest pain after she returned from work. She rated the pain at rated 7/10 at onset, described it as a  feeling of tightness in the chest and without relieving measures. The patient also reports associated S OB, diaphoresis and some heartburn. She states she took 2 baby aspirins at home without relief. She denies anxiety or recent stressors, strenuous activities, drinking alcohol, smoking, and CAD. She reports feeling nauseous at onset but denies vomiting, fever, chills, cough, diarrhea, and exposure to known COVID-19 individuals. Lab work at the ER significant for WBC of 11.6, potassium of 3.3, and CK of 220. Chest x-ray nonacute and EKG showed normal sinus rhythm. She received Pepcid, full dose aspirin, morphine and Zofran at the ER. Medicine consulted for patient admission and further work-up. At time of this assessment, patient lying in bed on room air and did not appear to be in acute distress. She rates her chest pain at 5/10. She works as a  in the high school.       PMH listed below: Reviewed with the patient    PAST MEDICAL, SURGICAL, FAMILY, and SOCIAL HISTORY     Past Medical History:   Diagnosis Date    Chronic pain     sciatica    GERD (gastroesophageal reflux disease)     Hyperlipidemia 7/2010    Hypertension 2007     Past Surgical History:   Procedure Laterality Date    CARDIAC CATHETERIZATION      CARDIOVASCULAR STRESS TEST  2/2013    normal    TUBAL LIGATION       Family History   Problem Relation Age of Intravenous Once Radha Washington, DO        0.9 % sodium chloride bolus  1,000 mL Intravenous Once Radha Zoila, DO         Current Outpatient Medications   Medication Sig Dispense Refill    simvastatin (ZOCOR) 40 MG tablet Take 1 tablet by mouth every evening. 30 tablet 3    atenolol-chlorthalidone (TENORETIC 100) 100-25 MG per tablet Take 1 tablet by mouth daily. 30 tablet 4    lisinopril (PRINIVIL;ZESTRIL) 20 MG tablet Take 1 tablet by mouth daily. 30 tablet 4    famotidine (PEPCID) 40 MG tablet Take 1 tablet by mouth 2 times daily. 60 tablet 2    ibuprofen (ADVIL;MOTRIN) 200 MG tablet Take 200 mg by mouth every 6 hours as needed.  aspirin 81 MG tablet Take 81 mg by mouth daily. Allergies  No Known Allergies    REVIEW OF SYSTEMS   Within above limitations. 14 point review of systems reviewed. Pertinent positive or negative as per HPI or otherwise negative per 14 point systems review. PHYSICAL EXAM     Wt Readings from Last 3 Encounters:   08/26/20 140 lb (63.5 kg)   05/13/14 150 lb 12.8 oz (68.4 kg)   04/18/14 145 lb (65.8 kg)       Blood pressure 132/86, pulse 99, temperature 98.6 °F (37 °C), temperature source Oral, resp. rate 20, height 5' 3\" (1.6 m), weight 140 lb (63.5 kg), SpO2 97 %, not currently breastfeeding. General - AAO x 3  Psych - Appropriate affect/speech. No agitation  Eyes - Eye lids intact. No scleral icterus  ENT - Lips wnl. External ear clear/dry/intact. No thyromegaly on inspection  Neuro - No gross peripheral or central neuro deficits on inspection  Heart - Sinus. RRR. S1 and S2 present. No added HS/murmurs appreciated. No elevated JVD appreciated. Mid epigastric tenderness to palpation. Lung - Adequate air entry b/l, No crackles/wheezes appreciated  GI - Soft. No guarding/rigidity. No hepatosplenomegaly/ascites. BS+   - No CVA/suprapubic tenderness or palpable bladder distension  Skin - Intact. No rash/petechiae/ecchymosis.  Warm extremities  MSK - Joints with normal ROM. No joint swellings      LABS AND IMAGING   CBC  [unfilled]    Last 3 Hemoglobin  Lab Results   Component Value Date    HGB 13.9 08/26/2020    HGB 13.8 02/10/2015    HGB 13.9 02/18/2013     Last 3 WBC/ANC  Lab Results   Component Value Date    WBC 11.6 08/26/2020    WBC 10.8 02/10/2015    WBC 14.2 02/18/2013     No components found for: GRNLOCTYABS  Last 3 Platelets  No results found for: PLATELET  Chemistry  [unfilled]  [unfilled]  No results found for: LDH  Coagulation Studies  Lab Results   Component Value Date    INR 0.97 02/10/2015     Liver Function Studies  Lab Results   Component Value Date    ALT 22 08/26/2020    AST 29 08/26/2020    ALKPHOS 74 08/26/2020       Recent Imaging    EXAMINATION:    ONE XRAY VIEW OF THE CHEST         8/26/2020 7:38 pm         COMPARISON:    Chest radiograph performed 02/10/2015.         HISTORY:    ORDERING SYSTEM PROVIDED HISTORY: chest pain    TECHNOLOGIST PROVIDED HISTORY:    Reason for exam:->chest pain    Reason for Exam: chest pain    Acuity: Acute    Type of Exam: Initial    Mechanism of Injury: chest pain    Relevant Medical/Surgical History: chest pain         FINDINGS:    There is no acute consolidation or effusion.  There is no pneumothorax.  The    mediastinal structures are unremarkable.  The upper abdomen is unremarkable. The extrathoracic soft tissues are unremarkable. Adrian Sos is no acute osseous    abnormality.              Impression    No acute cardiopulmonary process.               Relevant labs and imaging reviewed    ASSESSMENT AND PLAN     Ms. Toribio is a 47-year-old female who presents with acute onset mid epigastric chest pain. A/P as follows; Acute Chest pain. R/O ACS. No known cardiac history/CAD. EKG and CXR nonacute. Initial troponin and d-dimer normal.  CK elevated at 220.   Received full dose aspirin, morphine, pepcid, and Zofran at the ER.  -[facility]  -N.p.o. post midnight pending cardiology eval.  -Cardiology consult  -Serial troponin  -Nitro PRN  -EKG in a.m.  -Echo  -ASA, BB. Home lisinopril resumed. -PPI  -Repeat CK  -IV fluids  -Tums prn    Mild hypokalemia. K of 3.3.  -Replace    Leukocytosis. WBC 11.6. Patient afebrile. Chest x-ray nonacute. No respiratory symptoms. Urinalysis pending. Consider initiating antibiotics if patient becomes febrile.     Hypertension-lisinopril  Hyperlipidemia-Zocor  GERD-Protonix    DVT Prophylaxis: lovenox    GI Prophylaxis: protonix        Case d/w ED physician and Attending Dr. Ish Hayes, CNP  8/26/2020 at 10:53 PM

## 2020-08-28 LAB
CULTURE: NORMAL
Lab: NORMAL
SPECIMEN: NORMAL

## 2024-03-25 ENCOUNTER — HOSPITAL ENCOUNTER (OUTPATIENT)
Dept: WOMENS IMAGING | Age: 69
Discharge: HOME | End: 2024-03-25
Payer: MEDICARE

## 2024-03-25 VITALS — BODY MASS INDEX: 24.45 KG/M2 | HEIGHT: 63 IN | WEIGHT: 138 LBS

## 2024-03-25 DIAGNOSIS — Z12.31 BREAST CANCER SCREENING BY MAMMOGRAM: ICD-10-CM

## 2024-03-25 PROCEDURE — 77063 BREAST TOMOSYNTHESIS BI: CPT

## 2025-06-24 ENCOUNTER — TRANSCRIBE ORDERS (OUTPATIENT)
Dept: ADMINISTRATIVE | Age: 70
End: 2025-06-24

## 2025-06-24 DIAGNOSIS — Z12.31 BREAST CANCER SCREENING BY MAMMOGRAM: Primary | ICD-10-CM
